# Patient Record
Sex: FEMALE | Race: WHITE
[De-identification: names, ages, dates, MRNs, and addresses within clinical notes are randomized per-mention and may not be internally consistent; named-entity substitution may affect disease eponyms.]

---

## 2017-03-29 NOTE — [facility]
PROCEDURE 

MRI cervical spine without contrast. 

 

HISTORY 

Neck pain. Surgery 10 years ago. Right radiculopathy for 10 days. 

 

TECHNIQUE 

Sagittal T1, sagittal T2, sagittal STIR, axial T2, and axial T2 gradient 

sequences are provided. 

 

COMPARISON 

None at this institution. 

 

FINDINGS 

There is reversal of cervical lordosis. There is 2 millimeters of 

anterolisthesis at C3-C4, 3 millimeters of anterolisthesis at C4-C5, and 

1-2 millimeters of retrolisthesis at C5-C6. There is no worrisome marrow 

lesion. There is no cord signal abnormality. The cervicomedullary junction

is unremarkable. 

Degenerative findings by individual level are as follows: 

C2-C3: There is facet hypertrophy which is greater on the right. There is 

mild right foraminal narrowing. 

C3-C4: There is facet hypertrophy which is greater on the right. There is 

at least mild and more likely mild-to-moderate right foraminal narrowing. 

C4-C5: There is uncinate process spurring. There is facet hypertrophy 

which is greater on the right. There is no high-grade canal or foraminal 

compromise. 

C5-C6: There is a disc osteophyte complex and uncinate process spurring. 

There is buckling of ligamentum flavum midline AP diameter of the thecal 

sac measures 10 millimeters, minimally narrowed. Foraminal narrowing 

bilaterally appears high-grade. 

C6-C7: Disc osteophyte complex has right paracentral protruding component.

There is mild foraminal narrowing. 

C7-T1: There is no canal or foraminal compromise. There is a partial 

laminectomy defect on the right. 

There is mild motion degradation. In addition, the patient moved between 

axial and sagittal series, reference lines are slightly off. 

 

 

IMPRESSION 

Degenerative changes in the cervical spine are greatest at C5-C6. 

 

Electronically signed by: Guido Vee MD (Mar 29, 2017 15:06:03)

## 2018-03-07 ENCOUNTER — HOSPITAL ENCOUNTER (EMERGENCY)
Dept: HOSPITAL 63 - ER | Age: 53
Discharge: LEFT BEFORE DISCHARGE | End: 2018-03-07
Payer: COMMERCIAL

## 2018-03-07 VITALS
SYSTOLIC BLOOD PRESSURE: 155 MMHG | SYSTOLIC BLOOD PRESSURE: 155 MMHG | SYSTOLIC BLOOD PRESSURE: 155 MMHG | SYSTOLIC BLOOD PRESSURE: 155 MMHG | DIASTOLIC BLOOD PRESSURE: 85 MMHG | DIASTOLIC BLOOD PRESSURE: 85 MMHG | DIASTOLIC BLOOD PRESSURE: 85 MMHG | DIASTOLIC BLOOD PRESSURE: 85 MMHG

## 2018-03-07 DIAGNOSIS — Z87.891: ICD-10-CM

## 2018-03-07 DIAGNOSIS — R07.89: Primary | ICD-10-CM

## 2018-03-07 DIAGNOSIS — Z88.0: ICD-10-CM

## 2018-03-07 DIAGNOSIS — J45.909: ICD-10-CM

## 2018-03-07 DIAGNOSIS — M79.602: ICD-10-CM

## 2018-03-07 LAB
ALBUMIN SERPL-MCNC: 4.1 G/DL (ref 3.4–5)
ALBUMIN/GLOB SERPL: 1.3 {RATIO} (ref 1–1.7)
ALP SERPL-CCNC: 82 U/L (ref 46–116)
ALT SERPL-CCNC: 24 U/L (ref 14–59)
ANION GAP SERPL CALC-SCNC: 11 MMOL/L (ref 6–14)
AST SERPL-CCNC: 18 U/L (ref 15–37)
BASOPHILS # BLD AUTO: 0.1 X10^3/UL (ref 0–0.2)
BASOPHILS NFR BLD: 2 % (ref 0–3)
BILIRUB SERPL-MCNC: 0.4 MG/DL (ref 0.2–1)
BUN/CREAT SERPL: 20 (ref 6–20)
CA-I SERPL ISE-MCNC: 14 MG/DL (ref 7–20)
CALCIUM SERPL-MCNC: 9.1 MG/DL (ref 8.5–10.1)
CHLORIDE SERPL-SCNC: 102 MMOL/L (ref 98–107)
CO2 SERPL-SCNC: 26 MMOL/L (ref 21–32)
CREAT SERPL-MCNC: 0.7 MG/DL (ref 0.6–1)
EOSINOPHIL NFR BLD: 0.2 X10^3/UL (ref 0–0.7)
EOSINOPHIL NFR BLD: 5 % (ref 0–3)
ERYTHROCYTE [DISTWIDTH] IN BLOOD BY AUTOMATED COUNT: 13.6 % (ref 11.5–14.5)
GFR SERPLBLD BASED ON 1.73 SQ M-ARVRAT: 88.2 ML/MIN
GLOBULIN SER-MCNC: 3.2 G/DL (ref 2.2–3.8)
GLUCOSE SERPL-MCNC: 117 MG/DL (ref 70–99)
HCT VFR BLD CALC: 42.5 % (ref 36–47)
HGB BLD-MCNC: 14.6 G/DL (ref 12–15.5)
LYMPHOCYTES # BLD: 1.8 X10^3/UL (ref 1–4.8)
LYMPHOCYTES NFR BLD AUTO: 37 % (ref 24–48)
MCH RBC QN AUTO: 29 PG (ref 25–35)
MCHC RBC AUTO-ENTMCNC: 34 G/DL (ref 31–37)
MCV RBC AUTO: 83 FL (ref 79–100)
MONO #: 0.6 X10^3/UL (ref 0–1.1)
MONOCYTES NFR BLD: 12 % (ref 0–9)
NEUT #: 2.1 X10^3UL (ref 1.8–7.7)
NEUTROPHILS NFR BLD AUTO: 44 % (ref 31–73)
PLATELET # BLD AUTO: 306 X10^3/UL (ref 140–400)
POTASSIUM SERPL-SCNC: 3.7 MMOL/L (ref 3.5–5.1)
PROT SERPL-MCNC: 7.3 G/DL (ref 6.4–8.2)
RBC # BLD AUTO: 5.09 X10^6/UL (ref 3.5–5.4)
SODIUM SERPL-SCNC: 139 MMOL/L (ref 136–145)
WBC # BLD AUTO: 4.8 X10^3/UL (ref 4–11)

## 2018-03-07 PROCEDURE — 80053 COMPREHEN METABOLIC PANEL: CPT

## 2018-03-07 PROCEDURE — 36415 COLL VENOUS BLD VENIPUNCTURE: CPT

## 2018-03-07 PROCEDURE — 85025 COMPLETE CBC W/AUTO DIFF WBC: CPT

## 2018-03-07 PROCEDURE — 84484 ASSAY OF TROPONIN QUANT: CPT

## 2018-03-07 PROCEDURE — 93005 ELECTROCARDIOGRAM TRACING: CPT

## 2018-03-07 PROCEDURE — 82553 CREATINE MB FRACTION: CPT

## 2018-03-07 PROCEDURE — 83880 ASSAY OF NATRIURETIC PEPTIDE: CPT

## 2018-03-07 PROCEDURE — 71045 X-RAY EXAM CHEST 1 VIEW: CPT

## 2018-03-07 NOTE — RAD
Chest radiograph 3/7/2018 9:53 AM



Indication: Left-sided chest pain



Comparison: None available



Technique: Single portable upright frontal view of the chest is provided.



Findings:



Cardiomediastinal silhouette is within normal limits. No pleural effusions,

pulmonary vascular congestion or pneumothorax. The lungs are clear.  Calcified

granuloma is identified in the left midlung.



Osseous structures are normal.



Impression: 



No acute cardiopulmonary process.

## 2018-03-07 NOTE — EKG
[facility] [address], [place] [address]

Test Date:    2018               Test Time:    07:37:16

Pat Name:     FREDY VALDES         Department:   

Patient ID:   [id number]           Room:          

Gender:       F                        Technician:   RACHNA

:          1965               Requested By: RADHA BURGER

Order Number: [id number]            Reading MD:     

                                 Measurements

Intervals                              Axis          

Rate:         84                       P:            -26

HI:           92                       QRS:          152

QRSD:         78                       T:            -22

QT:           378                                    

QTc:          450                                    

                           Interpretive Statements

SINUS RHYTHM

ABNORMAL RIGHT AXIS DEVIATION

R-S TRANSITION ZONE IN V LEADS DISPLACED TO THE LEFT

LOW LIMB LEAD VOLTAGE

QRS(T) CONTOUR ABNORMALITY

CONSISTENT WITH HIGH LATERAL INFARCT

PROBABLY OLD

ABNORMAL ECG

RI6.01

No previous ECG available for comparison

## 2018-03-07 NOTE — PHYS DOC
Adult General


Chief Complaint


Chief Complaint:  CHEST PAIN





HPI


HPI





Patient is a 52-year-old female who drove herself to the ED with a complaint of 

chest pain. The patient was awake this morning drinking coffee and watching TV 

when she developed pain about 5:45 AM. The pain is on the left lateral aspect 

of her left breast and also goes down the medial aspect of her left upper arm. 

It is described as a tightness. She's never had this type of pain before. She 

does have a history of asthma and allergies, used her inhaler twice without 

relief. The pain does not worsen with breathing. She thinks it does worsen 

somewhat with movement of her body or movement of her left arm. She may have 

been a little sweaty. She may have had some nausea. After she noticed the pain 

it seemed to get worse and go into her left arm. She describes it as "tightening

-tightening-tightening". She called her brother, who is a paramedic, and he 

told her to come in and get checked out. She did take 2 low-dose aspirin at 

about 6:30 AM.





Patient has no personal or family cardiac history. She personally has no 

history of hypertension or diabetes. She has smoked in the past but quit. She 

was told in the past that she had "very high cholesterol" but I'm unclear the 

result, she got checked out and was told she was fine later and has not been on 

any medication for that.





Daily medications are Nexium, pro-air, methadone 10 mg twice a day, Motrin 800 

mg about twice a day. She had right shoulder surgery in September and that's 

why she's taking the Motrin.





Patient has had a cervical laminectomy along time ago and has been taking 

methadone for pain for a long time. She used to take 6 pills a day but is down 

to 2, she is stepping down slowly with the intention of getting off of 

methadone. Her last dose was at 6 PM last night. She has not taken it yet this 

morning.





Patient does not work outside the home. Her  is a contractor in 

[employer] and is currently there.





PCP Libertad Mitchell





Review of Systems


Review of Systems





Constitutional: Denies fever or chills []





HENT: Denies nasal congestion 


Respiratory: As in history of present illness


Cardiovascular: As in history of present illness


GI: Denies abdominal pain


: Denies pregnancy, she has had a hysterectomy


Musculoskeletal: Right shoulder surgery in September, still takes Motrin for 

that


Integument: Denies rash 


Neurologic: Denies headache, focal weakness or sensory changes []





Current Medications


Current Medications





Current Medications








 Medications


  (Trade)  Dose


 Ordered  Sig/Rosana  Start Time


 Stop Time Status Last Admin


Dose Admin


 


 Aspirin


  ([facility]


 Aspirin)  162 mg  1X  ONCE  3/7/18 08:00


 3/7/18 08:01 UNV  


 











Allergies


Allergies





Allergies








Coded Allergies Type Severity Reaction Last Updated Verified


 


  Penicillins Allergy Unknown  3/7/18 Yes











Physical Exam


Physical Exam





Constitutional: Well developed, well nourished, no acute distress, non-toxic 

appearance. Alert, mentating normally, vital signs stable.


HENT: Normocephalic, atraumatic, bilateral external ears normal, oropharynx 

moist, no oral exudates, nose normal. []


Eyes:  conjunctiva normal, no discharge. [] 


Neck: Normal range of motion, no stridor. [] 


Cardiovascular:Heart rate regular rhythm, no murmur []


Lungs & Thorax:  Bilateral breath sounds clear to auscultation without wheezes, 

good air movement throughout bilaterally.


Abdomen: Bowel sounds normal, soft, no tenderness, no masses, no pulsatile 

masses. [] 


Skin: Warm, dry, no erythema, no rash. [] 


Back: No tenderness, no CVA tenderness. [] 


Extremities: no cyanosis, no clubbing, ROM intact, no edema. [] 


Neurologic: Alert and oriented X 3, normal motor function,  no focal deficits 

noted. []





EKG


EKG


12-lead EKG read by me. Sinus rhythm. Heart rate 84. There are no ST elevations 

or depressions. There is nonspecific T-wave inversion in lead 3. There are no 

acute ST or T wave changes indicative of ischemia or infarction. Nonspecific T-

wave abnormality. No STEMI. [id number][]





Radiology/Procedures


Radiology/Procedures


[]





Course & Med Decision Making


Course & Med Decision Making


Pertinent Labs and Imaging studies reviewed. (See chart for details)





52-year-old female presents ambulatory to the ED with left-sided chest and left 

upper arm pain for almost 2 hours characterized as "tightening". Patient does 

not have cardiac history. Cardiac risk factors include a history of "very high 

cholesterol" and a history of smoking. Initial EKG has some nonspecific T wave 

change but no STEMI. The patient took 2 low-dose aspirin at home, she was given 

2 more on arrival to the ED.





I advised the patient that we need to do a chest x-ray and some lab studies in 

addition to the EKG, but advised the patient that we will not be able in the 

emergency department to completely clear her as far as cardiac etiology of the 

pain. We discussed that my recommendation will be for admission to the hospital 

for further evaluation including serial enzymes, possible further testing and 

possible cardiology consultation. The patient immediately stated that she will 

not be able to be admitted to the hospital, she has animals at home and no one 

there to take care of them. At this time I asked the patient to just relax 

while we completed some testing and I will visit with her about those test 

results. She is comfortable with that plan.





Labs, chest x-ray are unremarkable. I discussed with the patient that ED 

testing is normal but does not rule out cardiac ischemia or coronary artery 

disease. I told her I recommend that she be admitted to the hospital for serial 

cardiac enzymes and further cardiac evaluation. Patient states she will talk to 

her family and consider her options.





Patient's daughter arrived and was visiting with the patient in the room. I 

stopped by to check in to see if the patient had made a decision. The patient's 

daughter stated to me "I'm concerned because I have friends who had similar 

symptoms, they checked them out and they were okay, and then they ended up 

having a heart attack". I reassured the daughter that in fact my recommendation 

is not to release her mother but I then went over my recommendation for 

admission to the hospital with the daughter present in the room. It appears to 

me that the patient had minimized my recommendation and that the daughter had 

actually gotten the impression that the patient was going to be released. I 

told the patient that I am going to ask her to sign out AGAINST MEDICAL ADVICE 

if she does decide to leave simply because it's my advice that she be admitted 

for the reasons discussed, the patient states she understands that "I've done 

it before".





Patient discussed with her daughter some more and then called me into the room 

and said that she is going to leave. I reiterated to the patient that she is 

welcome to return at any time and certainly should return if her symptoms 

change or worsen, she understands that and agrees. See instructions for plan.





[]





Dragon Disclaimer


Dragon Disclaimer


This electronic medical record was generated, in whole or in part, using a 

voice recognition dictation system.





Departure


Departure:


Impression:  


 Primary Impression:  


 Left sided chest pain


 Additional Impression:  


 History of smoking


Disposition:  07 AGAINST MEDICAL ADVICE


Condition:  STABLE


Referrals:  


LIBERTAD MITCHELL (PCP)


Patient Instructions:  Chest Pain (Nonspecific), Easy-to-Read, Discharge 

Against Medical Advice





Additional Instructions:  


At this time, I recommend that you take 1 full strength aspirin a day until you 

are advised otherwise by your primary care doctor or cardiologist. This is just 

a temporary measure until we are able to complete your cardiac workup.





Make an appointment as soon as possible to see your primary care provider. 

Discuss your symptoms with her so that she can recommend further testing. I do 

recommend further cardiac testing of some kind such as a stress test or nuclear 

study.





If your symptoms worsen, return to emergency at any time. Although you're 

leaving AGAINST MEDICAL ADVICE, you are urged to return immediately if you 

reconsider and we can admit you at that time. I do recommend that you be 

admitted to the hospital for 2 more blood draws to check cardiac enzymes, and 

then some further cardiac testing, probably a nuclear study, and possibly to 

see the cardiologist. This is standard workup for a patient who is having chest 

pain of unknown cause who might have some risk factors for heart disease.





Problem Qualifiers











RADHA BURGER MD Mar 7, 2018 08:12

## 2018-09-21 ENCOUNTER — HOSPITAL ENCOUNTER (OUTPATIENT)
Dept: HOSPITAL 63 - CT | Age: 53
Discharge: HOME | End: 2018-09-21
Attending: PHYSICIAN ASSISTANT
Payer: OTHER GOVERNMENT

## 2018-09-21 DIAGNOSIS — K76.0: ICD-10-CM

## 2018-09-21 DIAGNOSIS — Z87.891: ICD-10-CM

## 2018-09-21 DIAGNOSIS — K57.30: Primary | ICD-10-CM

## 2018-09-21 DIAGNOSIS — Z88.0: ICD-10-CM

## 2018-09-21 DIAGNOSIS — J45.909: ICD-10-CM

## 2018-09-21 PROCEDURE — 74176 CT ABD & PELVIS W/O CONTRAST: CPT

## 2018-09-21 NOTE — RAD
EXAM: Abdomen and pelvis CT without intravenous contrast.

 

HISTORY: Left lower quadrant pain.

 

TECHNIQUE: Computed tomographic images of the abdomen and pelvis were 

obtained without contrast. Multiplanar reformatting was performed. 

*One or more of the following individualized dose reduction techniques 

were utilized for this examination:  

1. Automated exposure control.  

2. Adjustment of the mA and/or kV according to patient size.  

3. Use of iterative reconstruction technique.

 

COMPARISON: None.

 

FINDINGS: Evaluation of the lower thorax is unremarkable. There is fatty 

infiltration of the liver along the falciform ligament. There is slight 

increased density within the dependent portions the gallbladder likely due

to artifact. No convincing gallstone is seen. The pancreas, spleen and 

adrenal glands are unremarkable. The kidneys are unremarkable. There is no

appendicitis. There are few colonic diverticula. There is no evidence of 

diverticulitis. There is no bowel obstruction. There is suspected slight 

pelvic floor relaxation. The uterus is surgically absent. There is no 

lymphadenopathy. There is no suspicious osseous lesion. There is a tiny 

fat-containing umbilical hernia. No suspicious osseous lesion is seen.

 

IMPRESSION: No acute abdominal or pelvic finding.

 

Electronically signed by: Melyssa Marr MD (9/21/2018 2:58 PM) [place] [id number]

## 2019-01-07 ENCOUNTER — HOSPITAL ENCOUNTER (OUTPATIENT)
Dept: HOSPITAL 61 - KCIC MRI | Age: 54
Discharge: HOME | End: 2019-01-07
Payer: OTHER GOVERNMENT

## 2019-01-07 DIAGNOSIS — M47.896: ICD-10-CM

## 2019-01-07 DIAGNOSIS — M25.78: ICD-10-CM

## 2019-01-07 DIAGNOSIS — M51.27: ICD-10-CM

## 2019-01-07 DIAGNOSIS — M51.37: ICD-10-CM

## 2019-01-07 DIAGNOSIS — M51.35: Primary | ICD-10-CM

## 2019-01-07 DIAGNOSIS — D18.09: ICD-10-CM

## 2019-01-07 PROCEDURE — 72148 MRI LUMBAR SPINE W/O DYE: CPT

## 2019-01-07 NOTE — [facility]
MRI Lumbar Spine without contrast

 

History: Lumbago for 4 months, pain into the left hip

 

Technique: Multiplanar, multi sequential noncontrast MR imaging was 

performed of the lumbar spine.

 

Comparison: None

 

Findings: 

Lumbar vertebral body stature and AP alignment are within normal limits. 

Conus terminates at T12-L1. There is moderate to severe degenerative disc 

disease L1-2, mild degenerative disc disease L2-3, L3-4, L5-S1 and mild 

disc desiccation L4-5. There is also degenerative disc disease of the 

visualized T11-12 level, also minimally at T12-L1.

 

T11-12: This level was not included on the axial images. There is a 

shallow posterior protrusion without significant spinal stenosis.

 

T12-L1: There is shallow posterior protrusion with associated annular tear

without spinal stenosis. Neural foramina are adequate.

 

L1-L2:  There is minimal disc osteophyte complex and bulge. Neural 

foramina and spinal canal are adequate. There is small hemangioma of the 

left L1 vertebral body superiorly.

 

L2-L3:  There is minimal disc osteophyte complex and bulge. Spinal canal 

and neural foramina are overall adequate. There is mild facet hypertrophic

change.

 

L3-L4:  There is minimal disc osteophyte complex and bulge. Spinal canal 

is adequate. There is very mild inferior narrowing of the distal left 

neural foramen, no displacement of the exiting left L3 nerve root, right 

neural foramen overall adequate.

 

L4-L5:  There is mild buckling of the ligamentum flavum and right facet 

hypertrophic change. Neural foramina and spinal canal are adequate.

 

L5-S1:  Spinal canal and right neural foramen are adequate. There is a 

small extrusion extending above the intervertebral disc space in the left 

neural foramen about 6 mm AP by about 3 to 4 mm CC by about 5 to 6 mm 

transverse. There is contact of the exiting left L5 nerve root, moderate 

to severe narrowing of the left neural foramen.

 

Impression: 

 

1.  There is a small extrusion extending above the intervertebral disc 

space in the left L5-S1 neural foramen with contact of the exiting left L5

nerve root.

2. There is multilevel thoracolumbar degenerative disc disease greatest at

L1-2. There is minimal spondylosis. There is no significant lumbar spinal 

stenosis.

 

Electronically signed by: Wade Cervantes MD (1/7/2019 11:05 AM) 

[id number]

## 2019-01-25 ENCOUNTER — HOSPITAL ENCOUNTER (OUTPATIENT)
Dept: HOSPITAL 61 - KCIC CT | Age: 54
Discharge: HOME | End: 2019-01-25
Payer: OTHER GOVERNMENT

## 2019-01-25 DIAGNOSIS — K59.00: ICD-10-CM

## 2019-01-25 DIAGNOSIS — R20.0: Primary | ICD-10-CM

## 2019-01-25 DIAGNOSIS — J45.909: ICD-10-CM

## 2019-01-25 DIAGNOSIS — Z86.718: ICD-10-CM

## 2019-01-25 PROCEDURE — 72193 CT PELVIS W/DYE: CPT

## 2019-01-25 NOTE — [facility]
CT study of the pelvis with contrast

 

Clinical indications: Numbness of left groin and labia. History of 

diverticulosis. History of hernia repair.

 

TECHNIQUE: After IV infusion of 89 cc of Omnipaque 300, helical CT 

scanning of the pelvis was performed from the iliac crest down to the 

perineum. GI contrast was administered per mouth.

 

 

 

RS compliance Statement

 

One or more of the following individualized dose reduction techniques were

utilized for this study:

1.  Automated exposure control

2.  Adjustment of the mA and/or kV according to patient size

3.  Use of iterative reconstruction technique

 

COMPARISON: September 21, 2018 CT study.

 

FINDINGS: The appendix is normal. Mild fecal retention is seen within the 

rectosigmoid region. No bowel wall thickening or perirectal or pericolonic

inflammatory change is seen. No abscess or free fluid or free air is seen.

No enlarged pelvic lymphadenopathy is evident. Uterus is surgically 

absent. Neither ovary is visualized. Urinary bladder wall is smooth. No 

inguinal hernia is evident. No lytic process is seen.

 

IMPRESSION: No acute abnormality is evident.

 

Electronically signed by: Tal Cota MD (1/25/2019 4:03 PM) [place] [id number]

## 2020-03-24 ENCOUNTER — HOSPITAL ENCOUNTER (OUTPATIENT)
Dept: HOSPITAL 63 - DXRAD | Age: 55
Discharge: HOME | End: 2020-03-24
Attending: PHYSICIAN ASSISTANT
Payer: OTHER GOVERNMENT

## 2020-03-24 DIAGNOSIS — R09.89: Primary | ICD-10-CM

## 2020-03-24 PROCEDURE — 71046 X-RAY EXAM CHEST 2 VIEWS: CPT

## 2020-03-24 NOTE — RAD
CHEST PA   LATERAL

 

History: Chest congestion 

 

Comparison: 3/17/2018 AP view of the chest.

 

Findings: 

Frontal and lateral views of the chest were obtained. The 

cardiomediastinal silhouette is normal. Pulmonary vasculature is normal. 

The lungs are clear. No pleural effusion or pneumothorax is seen. There is

no acute bone abnormality. 

 

IMPRESSION: 

No acute cardiopulmonary process. 

 

 

Electronically signed by: Sean Lo MD (3/24/2020 9:57 AM) [id number]

## 2020-04-03 ENCOUNTER — HOSPITAL ENCOUNTER (OUTPATIENT)
Dept: HOSPITAL 63 - CT | Age: 55
End: 2020-04-03
Attending: PHYSICIAN ASSISTANT
Payer: OTHER GOVERNMENT

## 2020-04-03 DIAGNOSIS — I70.0: ICD-10-CM

## 2020-04-03 DIAGNOSIS — R05: Primary | ICD-10-CM

## 2020-04-03 PROCEDURE — 71250 CT THORAX DX C-: CPT

## 2020-04-03 NOTE — RAD
Noncontrast CT scan of the chest compared to chest x-ray dated March 24 of 2020 for persistent cough.

 

TECHNIQUE: Contiguous helical 3 mm axial images are obtained from the 

thoracic inlet to the base of the diaphragm. Sagittal and coronal 

reformations are evaluated.

 

FINDINGS: No suspicious mediastinal, hilar, or axillary lymphadenopathy. 

Heart size within normal limits. No significant coronary artery 

calcifications. Minimal aortic calcifications. Central airways are patent.

Evaluation of the upper abdominal organs is limited by lack of IV 

contrast, with no gross morphologic abnormalities identified.No suspicious

osteoblastic or osteolytic bone lesions. No focal parenchymal 

abnormalities are identified. No suspicious lung nodules or masses. No 

pleural effusions.

 

IMPRESSION:

1. No discernible acute cardiopulmonary abnormalities. Para

 

 

PQRS Compliance Statement:

 

One or more of the following individualized dose reduction techniques were

utilized for this examination:  

1. Automated exposure control  

2. Adjustment of the mA and/or kV according to patient size  

3. Use of iterative reconstruction technique

 

Electronically signed by: Kyree Velasco MD (4/3/2020 1:38 PM) [id number]